# Patient Record
Sex: MALE | Race: WHITE | NOT HISPANIC OR LATINO | URBAN - METROPOLITAN AREA
[De-identification: names, ages, dates, MRNs, and addresses within clinical notes are randomized per-mention and may not be internally consistent; named-entity substitution may affect disease eponyms.]

---

## 2017-11-18 ENCOUNTER — EMERGENCY (EMERGENCY)
Facility: HOSPITAL | Age: 52
LOS: 1 days | End: 2017-11-18
Payer: COMMERCIAL

## 2017-11-18 PROCEDURE — 99285 EMERGENCY DEPT VISIT HI MDM: CPT

## 2017-11-18 PROCEDURE — 71010: CPT | Mod: 26

## 2017-11-18 PROCEDURE — 73130 X-RAY EXAM OF HAND: CPT | Mod: 26,LT

## 2017-11-18 PROCEDURE — 72170 X-RAY EXAM OF PELVIS: CPT | Mod: 26

## 2017-11-18 PROCEDURE — 70450 CT HEAD/BRAIN W/O DYE: CPT | Mod: 26

## 2017-11-18 PROCEDURE — 71260 CT THORAX DX C+: CPT | Mod: 26

## 2017-11-18 PROCEDURE — 74177 CT ABD & PELVIS W/CONTRAST: CPT | Mod: 26

## 2017-11-18 PROCEDURE — 72125 CT NECK SPINE W/O DYE: CPT | Mod: 26

## 2021-06-18 ENCOUNTER — NON-APPOINTMENT (OUTPATIENT)
Age: 56
End: 2021-06-18

## 2021-06-18 ENCOUNTER — APPOINTMENT (OUTPATIENT)
Dept: OPHTHALMOLOGY | Facility: CLINIC | Age: 56
End: 2021-06-18

## 2023-01-05 ENCOUNTER — TRANSCRIPTION ENCOUNTER (OUTPATIENT)
Age: 58
End: 2023-01-05

## 2023-01-05 ENCOUNTER — APPOINTMENT (OUTPATIENT)
Dept: CARDIOLOGY | Facility: CLINIC | Age: 58
End: 2023-01-05
Payer: COMMERCIAL

## 2023-01-05 VITALS — SYSTOLIC BLOOD PRESSURE: 140 MMHG | DIASTOLIC BLOOD PRESSURE: 82 MMHG

## 2023-01-05 VITALS
BODY MASS INDEX: 33.6 KG/M2 | OXYGEN SATURATION: 98 % | SYSTOLIC BLOOD PRESSURE: 136 MMHG | HEART RATE: 75 BPM | DIASTOLIC BLOOD PRESSURE: 80 MMHG | HEIGHT: 71 IN | WEIGHT: 240 LBS

## 2023-01-05 DIAGNOSIS — Z78.9 OTHER SPECIFIED HEALTH STATUS: ICD-10-CM

## 2023-01-05 DIAGNOSIS — S39.92XA UNSPECIFIED INJURY OF LOWER BACK, INITIAL ENCOUNTER: ICD-10-CM

## 2023-01-05 PROCEDURE — 99204 OFFICE O/P NEW MOD 45 MIN: CPT

## 2023-01-05 NOTE — HISTORY OF PRESENT ILLNESS
[FreeTextEntry1] : 58 year old male with no significant PMHx felt lightheaded and anxious last night while lying in bed. Couldn't sleep. Developed chest pain. Went to [facility] [place] but it was crowded and ended up leaving AMA before being seen. He was feeling better but still "a little off." Went to urgent care and they referred him to cardiology. Patient never smokes and drinks EtOH rarely. Walks 3 miles daily with no exertional complaints. \par \par There is no history of MI, CVA, CHF, or previous coronary intervention.\par

## 2023-01-05 NOTE — DISCUSSION/SUMMARY
[FreeTextEntry1] : 1. Chest Pain/Lightheadedness/Abnormal ECG: recommend echocardiogram and exercise nuclear stress testing. Please note that give Q waves on ECG, patient should have imaging accompanying his stress testing. Recommend carotid duplex. \par \par Will order CBC, CMP, TSH, Lipid Panel.\par \par Follow up after testing.

## 2023-01-05 NOTE — REVIEW OF SYSTEMS
[Feeling Fatigued] : feeling fatigued [Joint Pain] : joint pain [Joint Stiffness] : joint stiffness [Negative] : Gastrointestinal [FreeTextEntry5] : see HPI [de-identified] : see HPI

## 2023-01-05 NOTE — PHYSICAL EXAM
[Normal] : moves all extremities, no focal deficits, normal speech [de-identified] : No carotid bruits auscultated bilaterally

## 2023-01-24 ENCOUNTER — APPOINTMENT (OUTPATIENT)
Dept: CARDIOLOGY | Facility: CLINIC | Age: 58
End: 2023-01-24
Payer: COMMERCIAL

## 2023-01-24 VITALS
SYSTOLIC BLOOD PRESSURE: 120 MMHG | TEMPERATURE: 97.3 F | OXYGEN SATURATION: 97 % | BODY MASS INDEX: 34.3 KG/M2 | WEIGHT: 245 LBS | HEART RATE: 73 BPM | HEIGHT: 71 IN | DIASTOLIC BLOOD PRESSURE: 80 MMHG

## 2023-01-24 DIAGNOSIS — R42 DIZZINESS AND GIDDINESS: ICD-10-CM

## 2023-01-24 PROCEDURE — 99215 OFFICE O/P EST HI 40 MIN: CPT

## 2023-01-24 RX ORDER — ASPIRIN 81 MG
81 TABLET, DELAYED RELEASE (ENTERIC COATED) ORAL DAILY
Refills: 0 | Status: ACTIVE | COMMUNITY

## 2023-01-24 RX ORDER — NITROGLYCERIN 0.4 MG/1
0.4 TABLET SUBLINGUAL
Refills: 0 | Status: ACTIVE | COMMUNITY

## 2023-01-24 NOTE — REVIEW OF SYSTEMS
[Feeling Fatigued] : feeling fatigued [Joint Pain] : joint pain [Joint Stiffness] : joint stiffness [Negative] : Gastrointestinal [FreeTextEntry5] : see HPI [de-identified] : see HPI

## 2023-01-24 NOTE — PHYSICAL EXAM
[Normal] : moves all extremities, no focal deficits, normal speech [de-identified] : No carotid bruits auscultated bilaterally

## 2023-01-24 NOTE — REASON FOR VISIT
[Other: ____] : [unfilled] [FreeTextEntry3] : Zuleika Gaviria [FreeTextEntry1] : I saw this 58 year old  in post cath f/u on 01/24/23.  The right radial access site looks perfect no issues.  His cath showed minimal irregularities.\par Explained to him the interpretation of his EKG and the reason for his statin.\par He will reduce the dose of the statin and get blood work in 4 months and will reassess the statin dose.\par \par male with no significant PMHx felt lightheaded and anxious last night while lying in bed. Couldn't sleep. Developed chest pain. Went to [facility] [place] but it was crowded and ended up leaving AMA before being seen. He was feeling better but still "a little off." Went to urgent care and they referred him to cardiology. Patient never smokes and drinks EtOH rarely. Walks 3 miles daily with no exertional complaints. \par \par There is no history of MI, CVA, CHF, or previous coronary intervention.\par

## 2023-01-24 NOTE — DISCUSSION/SUMMARY
[FreeTextEntry1] : 1. Chest Pain/Lightheadedness/Abnormal ECG: recommend echocardiogram and exercise nuclear stress testing. Please note that give Q waves on ECG, patient should have imaging accompanying his stress testing. Recommend carotid duplex. \par \par Normal coronary angiogram

## 2023-02-01 ENCOUNTER — APPOINTMENT (OUTPATIENT)
Dept: CARDIOLOGY | Facility: CLINIC | Age: 58
End: 2023-02-01

## 2023-02-09 ENCOUNTER — APPOINTMENT (OUTPATIENT)
Dept: CARDIOLOGY | Facility: CLINIC | Age: 58
End: 2023-02-09

## 2023-02-15 ENCOUNTER — APPOINTMENT (OUTPATIENT)
Dept: CARDIOLOGY | Facility: CLINIC | Age: 58
End: 2023-02-15

## 2023-05-02 ENCOUNTER — APPOINTMENT (OUTPATIENT)
Dept: CARDIOLOGY | Facility: CLINIC | Age: 58
End: 2023-05-02
Payer: COMMERCIAL

## 2023-05-02 VITALS
SYSTOLIC BLOOD PRESSURE: 114 MMHG | BODY MASS INDEX: 35.43 KG/M2 | WEIGHT: 254 LBS | DIASTOLIC BLOOD PRESSURE: 70 MMHG | OXYGEN SATURATION: 95 % | HEART RATE: 58 BPM

## 2023-05-02 PROCEDURE — 99215 OFFICE O/P EST HI 40 MIN: CPT

## 2023-05-02 NOTE — REVIEW OF SYSTEMS
[Feeling Fatigued] : feeling fatigued [Joint Pain] : joint pain [Joint Stiffness] : joint stiffness [Negative] : Gastrointestinal [FreeTextEntry5] : see HPI [de-identified] : see HPI

## 2023-05-02 NOTE — DISCUSSION/SUMMARY
[FreeTextEntry1] : I have scheduled a coronary CTA to determine plaque and calcium and whether he needs a statin.  He will also get blood work to see the impact of his current dose of statin.\par \par Normal coronary angiogram

## 2023-05-02 NOTE — PHYSICAL EXAM
[Normal] : moves all extremities, no focal deficits, normal speech [de-identified] : No carotid bruits auscultated bilaterally

## 2023-05-02 NOTE — REASON FOR VISIT
[Other: ____] : [unfilled] [FreeTextEntry3] : Zuleika Gaviria [FreeTextEntry1] : I saw this 58 year old  in f/u on 05/02/23. \par He had a normal cath for chest pain 5 months ago and was discharged on a statin.  He has not had any blood work done and also questions whether he needs to be on a statin.\par Explained to him the interpretation of his EKG and the reason for his statin.\par He  reduced  the dose of the statin and we will get  get blood work and will reassess the statin dose.\par He will also get a coronary CTA to determine plaque or calcium and whether he needs to be on a statin at all.\par \par male with no significant PMHx felt lightheaded and anxious last night while lying in bed. Couldn't sleep. Developed chest pain. Went [place] [facility] [place] it was crowded and ended up leaving AMA before being seen. He was feeling better but still "a little off." Went to urgent care and they referred him to cardiology. Patient never smokes and drinks EtOH rarely. Walks 3 miles daily with no exertional complaints. \par \par There is no history of MI, CVA, CHF, or previous coronary intervention.\par

## 2023-08-03 LAB
ANION GAP SERPL CALC-SCNC: 12 MMOL/L
BUN SERPL-MCNC: 17 MG/DL
CALCIUM SERPL-MCNC: 9.7 MG/DL
CHLORIDE SERPL-SCNC: 103 MMOL/L
CO2 SERPL-SCNC: 27 MMOL/L
CREAT SERPL-MCNC: 0.96 MG/DL
EGFR: 93 ML/MIN/1.73M2
GLUCOSE SERPL-MCNC: 95 MG/DL
POTASSIUM SERPL-SCNC: 4.8 MMOL/L
SODIUM SERPL-SCNC: 142 MMOL/L

## 2023-09-12 ENCOUNTER — APPOINTMENT (OUTPATIENT)
Dept: CARDIOLOGY | Facility: CLINIC | Age: 58
End: 2023-09-12
Payer: COMMERCIAL

## 2023-09-12 ENCOUNTER — NON-APPOINTMENT (OUTPATIENT)
Age: 58
End: 2023-09-12

## 2023-09-12 VITALS
HEART RATE: 59 BPM | WEIGHT: 254 LBS | DIASTOLIC BLOOD PRESSURE: 82 MMHG | BODY MASS INDEX: 35.56 KG/M2 | HEIGHT: 71 IN | SYSTOLIC BLOOD PRESSURE: 124 MMHG | OXYGEN SATURATION: 100 %

## 2023-09-12 PROCEDURE — 93000 ELECTROCARDIOGRAM COMPLETE: CPT

## 2023-09-12 PROCEDURE — 99215 OFFICE O/P EST HI 40 MIN: CPT | Mod: 25

## 2024-03-05 LAB — HBA1C MFR BLD HPLC: 5.3

## 2024-03-11 ENCOUNTER — LABORATORY RESULT (OUTPATIENT)
Age: 59
End: 2024-03-11

## 2024-03-12 ENCOUNTER — APPOINTMENT (OUTPATIENT)
Dept: CARDIOLOGY | Facility: CLINIC | Age: 59
End: 2024-03-12
Payer: COMMERCIAL

## 2024-03-12 ENCOUNTER — NON-APPOINTMENT (OUTPATIENT)
Age: 59
End: 2024-03-12

## 2024-03-12 VITALS
BODY MASS INDEX: 35.98 KG/M2 | HEIGHT: 71 IN | SYSTOLIC BLOOD PRESSURE: 138 MMHG | WEIGHT: 257 LBS | DIASTOLIC BLOOD PRESSURE: 72 MMHG | HEART RATE: 69 BPM | OXYGEN SATURATION: 98 %

## 2024-03-12 DIAGNOSIS — R94.31 ABNORMAL ELECTROCARDIOGRAM [ECG] [EKG]: ICD-10-CM

## 2024-03-12 DIAGNOSIS — Z00.00 ENCOUNTER FOR GENERAL ADULT MEDICAL EXAMINATION W/OUT ABNORMAL FINDINGS: ICD-10-CM

## 2024-03-12 DIAGNOSIS — R07.9 CHEST PAIN, UNSPECIFIED: ICD-10-CM

## 2024-03-12 DIAGNOSIS — E78.5 HYPERLIPIDEMIA, UNSPECIFIED: ICD-10-CM

## 2024-03-12 PROCEDURE — G2211 COMPLEX E/M VISIT ADD ON: CPT

## 2024-03-12 PROCEDURE — 93000 ELECTROCARDIOGRAM COMPLETE: CPT

## 2024-03-12 PROCEDURE — 99215 OFFICE O/P EST HI 40 MIN: CPT

## 2024-03-12 PROCEDURE — 99214 OFFICE O/P EST MOD 30 MIN: CPT

## 2024-03-12 RX ORDER — ATORVASTATIN CALCIUM 20 MG/1
20 TABLET, FILM COATED ORAL
Qty: 12 | Refills: 3 | Status: ACTIVE | COMMUNITY
Start: 1900-01-01 | End: 1900-01-01

## 2024-03-12 NOTE — REASON FOR VISIT
[Other: ____] : [unfilled] [FreeTextEntry3] : Zuleika Gaviria [FreeTextEntry1] : I saw this 59 year old man   in f/u on 03/12/24 He had a normal cath for chest pain  and was discharged on a statin.  He has not had any blood work done and also questions whether he needs to be on a statin. Explained to him the interpretation of his EKG and the reason for his statin. He  reduced  the dose of the statin and we will get  get blood work and will reassess the statin dose. He got  a coronary CTA to determine plaque or calcium and whether he needs to be on a statin at all.  This showed a calcium score of 1 and no plaque. Could probably hold the statin  male with no significant PMHx felt lightheaded and anxious last night while lying in bed. Couldn't sleep. Developed chest pain. [place] [facility] [place] it was crowded and ended up leaving AMA before being seen. He was feeling better but still "a little off." Went to urgent care and they referred him to cardiology. Patient never smokes and drinks EtOH rarely. Walks 3 miles daily with no exertional complaints.   There is no history of MI, CVA, CHF, or previous coronary intervention.

## 2024-03-12 NOTE — REVIEW OF SYSTEMS
[Feeling Fatigued] : feeling fatigued [Joint Pain] : joint pain [Joint Stiffness] : joint stiffness [Negative] : Gastrointestinal [FreeTextEntry5] : see HPI [de-identified] : see HPI

## 2024-03-12 NOTE — PHYSICAL EXAM
[Normal] : moves all extremities, no focal deficits, normal speech [de-identified] : No carotid bruits auscultated bilaterally

## 2024-03-12 NOTE — DISCUSSION/SUMMARY
[EKG obtained to assist in diagnosis and management of assessed problem(s)] : EKG obtained to assist in diagnosis and management of assessed problem(s) [FreeTextEntry1] :  he had a normal CTA with no plaque and very minimal calcium.  His lipid profile was acceptable.  could probably withhold taking a statin.  Normal coronary angiogram Normal  CTA with a calcium score of 1   He will continue the atorvastatin, 10 mg once a week.   EKG was normal  will see him again in 9 months.

## 2024-03-18 ENCOUNTER — APPOINTMENT (OUTPATIENT)
Dept: ORTHOPEDIC SURGERY | Facility: CLINIC | Age: 59
End: 2024-03-18
Payer: COMMERCIAL

## 2024-03-18 VITALS — HEIGHT: 71 IN | BODY MASS INDEX: 35.98 KG/M2 | WEIGHT: 257 LBS

## 2024-03-18 DIAGNOSIS — M23.91 UNSPECIFIED INTERNAL DERANGEMENT OF RIGHT KNEE: ICD-10-CM

## 2024-03-18 DIAGNOSIS — I21.A9 OTHER MYOCARDIAL INFARCTION TYPE: ICD-10-CM

## 2024-03-18 DIAGNOSIS — M17.11 UNILATERAL PRIMARY OSTEOARTHRITIS, RIGHT KNEE: ICD-10-CM

## 2024-03-18 PROCEDURE — 73564 X-RAY EXAM KNEE 4 OR MORE: CPT | Mod: RT

## 2024-03-18 PROCEDURE — 99204 OFFICE O/P NEW MOD 45 MIN: CPT | Mod: 25

## 2024-03-18 RX ORDER — MELOXICAM 15 MG/1
15 TABLET ORAL
Qty: 30 | Refills: 0 | Status: COMPLETED | COMMUNITY
Start: 2024-03-18 | End: 2024-04-17

## 2024-03-18 NOTE — DISCUSSION/SUMMARY
[de-identified] : The patient was advised of the diagnosis. The natural history of the pathology was explained in full to the patient in layman's terms. The risks and benefits of surgical and non-surgical treatment alternatives were explained in full to the patient. All questions were answered.

## 2024-03-18 NOTE — HISTORY OF PRESENT ILLNESS
[Sudden] : sudden [6] : 6 [5] : 5 [Radiating] : radiating [Sharp] : sharp [Shooting] : shooting [Household chores] : household chores [Leisure] : leisure [Sleep] : sleep [Lying in bed] : lying in bed [Full time] : Work status: full time [de-identified] : 03/18/2024: 58 y/o male c/o right knee pain that started yesterday. He slipped when stepping off a curb and felt a "pop." Describes medial knee pain with limited ROM. Pain with ambulation. Difficulty rising from a seated position. He has been using topicals with little relief. Denies history of prior injury.  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

## 2024-03-18 NOTE — ASSESSMENT
[FreeTextEntry1] : Underlying pathology reviewed and treatment options discussed. 03/18/2024: RT knee x-rays, 4 views, reveal loss of 50% medial joint space. Given his injury and the pop he felt, MRI is needed to evaluate for meniscus tear.  Obtain MRI RT knee R/O MMT. Activity modification as tolerated.  Prescribed Mobic. I discussed the proper use of this medication as well as potential side effects.  Questions addressed.   The documentation recorded by the scribe accurately reflects the service I personally performed and the decisions made by me. I, Claudio Mcdermott, [employer], attest that this documentation has been prepared under the direction and in the presence of Provider Adiel Peralta MD.   The patient was seen by Adiel Peralta MD. The following radiographs were ordered and read by me during this patient's visit. I reviewed each radiograph in detail with the patient, and discussed the findings as highlighted below.

## 2024-03-18 NOTE — PHYSICAL EXAM
[Right] : right knee [5___] : hamstring 5[unfilled]/5 [Positive] : positive Janey [] : no erythema [TWNoteComboBox7] : flexion 120 degrees [de-identified] : extension 3 degrees

## 2024-03-23 ENCOUNTER — APPOINTMENT (OUTPATIENT)
Dept: MRI IMAGING | Facility: CLINIC | Age: 59
End: 2024-03-23
Payer: COMMERCIAL

## 2024-03-23 PROCEDURE — 73721 MRI JNT OF LWR EXTRE W/O DYE: CPT | Mod: RT

## 2024-03-25 ENCOUNTER — APPOINTMENT (OUTPATIENT)
Dept: ORTHOPEDIC SURGERY | Facility: CLINIC | Age: 59
End: 2024-03-25
Payer: COMMERCIAL

## 2024-03-25 VITALS — BODY MASS INDEX: 35.98 KG/M2 | HEIGHT: 71 IN | WEIGHT: 257 LBS

## 2024-03-25 DIAGNOSIS — S83.231D COMPLEX TEAR OF MEDIAL MENISCUS, CURRENT INJURY, RIGHT KNEE, SUBSEQUENT ENCOUNTER: ICD-10-CM

## 2024-03-25 PROCEDURE — 99214 OFFICE O/P EST MOD 30 MIN: CPT

## 2024-03-25 NOTE — PHYSICAL EXAM
[Right] : right knee [5___] : hamstring 5[unfilled]/5 [Positive] : positive Janey [] : no erythema [TWNoteComboBox7] : flexion 120 degrees [de-identified] : extension 3 degrees

## 2024-03-25 NOTE — DISCUSSION/SUMMARY
[de-identified] : The patient was advised of the diagnosis. The natural history of the pathology was explained in full to the patient in layman's terms. The risks and benefits of surgical and non-surgical treatment alternatives were explained in full to the patient. All questions were answered.

## 2024-03-25 NOTE — DATA REVIEWED
[MRI] : MRI [Right] : of the right [Knee] : knee [I independently reviewed and interpreted images and report] : I independently reviewed and interpreted images and report [FreeTextEntry1] : Shows MMT

## 2024-03-25 NOTE — HISTORY OF PRESENT ILLNESS
[Sudden] : sudden [4] : 4 [3] : 3 [Localized] : localized [Sharp] : sharp [Shooting] : shooting [Squeezing] : squeezing [Household chores] : household chores [Leisure] : leisure [Sleep] : sleep [Lying in bed] : lying in bed [Full time] : Work status: full time [de-identified] : 03/25/2024: Right knee follow up. He had the MRI done though the report is not ready yet. Symptoms continue.  03/18/2024: 58 y/o male c/o right knee pain that started yesterday. He slipped when stepping off a curb and felt a "pop." Describes medial knee pain with limited ROM. Pain with ambulation. Difficulty rising from a seated position. He has been using topicals with little relief. Denies history of prior injury.  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

## 2024-03-25 NOTE — ASSESSMENT
[FreeTextEntry1] : Underlying pathology reviewed and treatment options discussed. 03/18/2024: RT knee x-rays, 4 views, reveal loss of 50% medial joint space. Given his injury and the pop he felt, MRI is needed to evaluate for meniscus tear.  Obtain MRI RT knee R/O MMT. Activity modification as tolerated.  Prescribed Mobic. I discussed the proper use of this medication as well as potential side effects.  Questions addressed.   03/25/2024: MRI reviewed and discussed.  Based on my independent interpretation of the MRI images there is a MMT. We discussed the availability of surgical intervention. This would be a medial meniscectomy. Advised this would be a meniscectomy as opposed to a repair. Discussed with the patient the procedure, recovery process, risks and benefits; the patient demonstrated understanding.  Activity modification as tolerated.  Questions addressed.   The patient was advised of the diagnosis.  The natural history of the pathology was explained in full to the patient in layman's terms. All questions were answered.  The risks and benefits of surgical and non-surgical treatment alternatives were explained in full to the patient.    The patient demonstrated a full understanding of the surgical and non-surgical options.  The risks of surgery were outlined in full to the patient including but not limited to bleeding, scarring, infection, sepsis, neurologic injury, vascular injury, failure to resolve symptoms, symptom recurrence, the need for further surgery, non-healing, wound breakdown, deep vein thrombosis, pulmonary embolism, spontaneous osteonecrosis, anesthesia complications and even death.  The patient understood all the risks and accepted them and understood that other complications could occur that are not mentioned above.  The intraoperative plan, post-operative plan, post-operative expectations and limitations were explained in full.  Expectations from non-surgical treatment were explained in full as well.  The patient demonstrated a complete understanding of the treatment alternatives and requested the above-mentioned procedure.  This will be scheduled accordingly.   The documentation recorded by the scribe accurately reflects the service I personally performed and the decisions made by me. I, Arnoldo Nice, attest that this documentation has been prepared under the direction and in the presence of Provider Adiel Peralta MD.  The patient was seen by Adiel Peralta MD.

## 2024-11-22 ENCOUNTER — APPOINTMENT (OUTPATIENT)
Dept: CARDIOLOGY | Facility: CLINIC | Age: 59
End: 2024-11-22
Payer: COMMERCIAL

## 2024-11-22 ENCOUNTER — NON-APPOINTMENT (OUTPATIENT)
Age: 59
End: 2024-11-22

## 2024-11-22 VITALS
BODY MASS INDEX: 35 KG/M2 | WEIGHT: 250 LBS | HEART RATE: 70 BPM | OXYGEN SATURATION: 96 % | DIASTOLIC BLOOD PRESSURE: 76 MMHG | HEIGHT: 71 IN | SYSTOLIC BLOOD PRESSURE: 140 MMHG

## 2024-11-22 DIAGNOSIS — R94.31 ABNORMAL ELECTROCARDIOGRAM [ECG] [EKG]: ICD-10-CM

## 2024-11-22 DIAGNOSIS — E78.5 HYPERLIPIDEMIA, UNSPECIFIED: ICD-10-CM

## 2024-11-22 PROCEDURE — 99214 OFFICE O/P EST MOD 30 MIN: CPT

## 2024-11-22 PROCEDURE — 93000 ELECTROCARDIOGRAM COMPLETE: CPT
